# Patient Record
Sex: MALE | Race: WHITE | NOT HISPANIC OR LATINO | URBAN - METROPOLITAN AREA
[De-identification: names, ages, dates, MRNs, and addresses within clinical notes are randomized per-mention and may not be internally consistent; named-entity substitution may affect disease eponyms.]

---

## 2019-02-28 ENCOUNTER — OFFICE VISIT (OUTPATIENT)
Dept: NEUROLOGY | Facility: CLINIC | Age: 59
End: 2019-02-28

## 2019-02-28 VITALS
DIASTOLIC BLOOD PRESSURE: 78 MMHG | WEIGHT: 286 LBS | BODY MASS INDEX: 40.04 KG/M2 | OXYGEN SATURATION: 97 % | HEIGHT: 71 IN | HEART RATE: 70 BPM | SYSTOLIC BLOOD PRESSURE: 132 MMHG

## 2019-02-28 DIAGNOSIS — R25.1 TREMOR: Primary | ICD-10-CM

## 2019-02-28 PROCEDURE — 99204 OFFICE O/P NEW MOD 45 MIN: CPT | Performed by: PSYCHIATRY & NEUROLOGY

## 2019-02-28 RX ORDER — IBUPROFEN 800 MG/1
800 TABLET ORAL 2 TIMES DAILY
Refills: 0 | COMMUNITY
Start: 2019-01-18

## 2019-02-28 RX ORDER — DAPAGLIFLOZIN 5 MG/1
5 TABLET, FILM COATED ORAL DAILY
Refills: 4 | COMMUNITY
Start: 2019-02-18

## 2019-02-28 RX ORDER — PRIMIDONE 50 MG/1
50 TABLET ORAL NIGHTLY
Qty: 30 TABLET | Refills: 1 | Status: SHIPPED | OUTPATIENT
Start: 2019-02-28 | End: 2019-04-10 | Stop reason: SDUPTHER

## 2019-02-28 RX ORDER — ATORVASTATIN CALCIUM 40 MG/1
40 TABLET, FILM COATED ORAL DAILY
Refills: 0 | COMMUNITY
Start: 2019-01-04

## 2019-02-28 RX ORDER — ASPIRIN 325 MG
325 TABLET ORAL DAILY
COMMUNITY

## 2019-02-28 NOTE — PROGRESS NOTES
Subjective:    CC: Santino Burleson is seen today in consultation at the request of Isaías Nair MD for Tremors       HPI:  Patient is a 58-year-old male with past medical history of hypertension, hyperlipidemia, diabetes referred to the clinic for evaluation of tremors.  He reports that he started having tremors about an year ago.  It started in both his hands but slightly more so on the left than on the right.  Tremors are predominantly present when he is using his hands but occasionally he does notice it at rest and also.  It is difficult for him to hold cup of coffee or glass of water.  It is become really difficult for him to write.  He denies any difficulty with walking.  He is concerned because his uncle has a diagnosis of Parkinson's disease.  He denies any loss of sense of smell, problems with memory or episodes of acting out dreams at night.  Anxiety and nervousness makes tremors worse.    The following portions of the patient's history were reviewed today and updated as of 02/28/2019  : allergies, social history and problem list.  This document will be scanned to patient's chart.      Current Outpatient Medications:   •  aspirin 325 MG tablet, Take 325 mg by mouth Daily., Disp: , Rfl:   •  atorvastatin (LIPITOR) 40 MG tablet, Take 40 mg by mouth Daily., Disp: , Rfl: 0  •  FARXIGA 5 MG tablet tablet, Take 5 mg by mouth Daily., Disp: , Rfl: 4  •  ibuprofen (ADVIL,MOTRIN) 800 MG tablet, Take 800 mg by mouth 2 (Two) Times a Day., Disp: , Rfl: 0  •  metFORMIN (GLUCOPHAGE) 500 MG tablet, Take 500 mg by mouth 2 (Two) Times a Day., Disp: , Rfl: 5  •  primidone (MYSOLINE) 50 MG tablet, Take 1 tablet by mouth Every Night. Take half tab at bedtime for one week and then 1 tab after that., Disp: 30 tablet, Rfl: 1   Past Medical History:   Diagnosis Date   • Cancer (CMS/HCC)    • Diabetes mellitus (CMS/HCC)    • Hypertension    • Parkinson disease (CMS/HCC)    • Stroke (CMS/HCC)     eye      History reviewed. No  "pertinent surgical history.   Family History   Problem Relation Age of Onset   • Heart disease Father       Review of Systems   Eyes: Positive for visual disturbance.   Cardiovascular: Positive for palpitations.   Musculoskeletal: Positive for arthralgias and back pain.   Neurological: Positive for dizziness, light-headedness, numbness and headache.   Psychiatric/Behavioral: The patient is nervous/anxious.    All other systems reviewed and are negative.      All other systems reviewed and are negative     Objective:    /78 (BP Location: Right arm, Patient Position: Sitting, Cuff Size: Adult)   Pulse 70   Ht 180.3 cm (71\")   Wt 130 kg (286 lb)   SpO2 97%   BMI 39.89 kg/m²     Neurology Exam:    General apperance: NAD.     Mental status: Alert, awake and oriented to time place and person.    Recent and Remote memory: Can recall 3/3 objects at 5 minutes. Can recall historical events.     Attention span and Concentration: Serial 7s: Normal.     Fund of knowledge:  Normal.     Language and Speech: No aphasia or dysarthria.    Naming , Repitition and Comprehension:  Can name objects, repeat a sentence and follow commands. Speech is clear and fluent with good repetition, comprehension, and naming.    Cranial Nerves:   CN II: Visual fields are full. Intact. Fundi - Normal, No papillederma, Pupils - CHA  CN III, IV and VI: Extraocular movements are intact. Normal saccades.   CN V: Facial sensation is intact.   CN VII: Muscles of facial expression reveal no asymmetry. Intact.   CN VIII: Hearing is intact. Whispered voice intact.   CN IX and X: Palate elevates symmetrically. Intact  CN XI: Shoulder shrug is intact.   CN XII: Tongue is midline without evidence of atrophy or fasciculation.     Motor:  Right UE muscle strength 5/5. Normal tone.     Left UE muscle strength 5/5. Normal tone.      Right LE muscle strength5/5. Normal tone.     Left LE muscle strength 5/5. Normal tone.      Sensory: Normal light touch, " vibration and pinprick sensation bilaterally.    DTRs: 2+ bilaterally in upper and lower extremities.    Babinski: Negative bilaterally.    Co-ordination: Normal finger-to-nose, heel to shin B/L.  Mild postural tremors bilaterally and moderate action tremors were seen bilaterally.  No resting tremors noted.  No head tremor seen.    Rhomberg: Negative.    Gait: Normal.    Cardiovascular: Regular rate and rhythm without murmur, gallop or rub.    Assessment and Plan:  1. Tremor  Benign essential tremors.  I reassured him that he does not have Parkinson's.  He had normal tone without any evidence of rigidity, walking was normal so as finger tapping and foot tapping.  He did have both action and postural tremors representing the likely benign essential tremors.  Will initiate primidone 25 mg at bedtime for 1 week then 50 mg after that for symptomatic relief.  If no response and no side effects and it can be increased further.       Return in about 6 weeks (around 4/11/2019).     Chapito Menezes MD

## 2019-04-10 ENCOUNTER — OFFICE VISIT (OUTPATIENT)
Dept: NEUROLOGY | Facility: CLINIC | Age: 59
End: 2019-04-10

## 2019-04-10 VITALS
BODY MASS INDEX: 40.04 KG/M2 | HEIGHT: 71 IN | SYSTOLIC BLOOD PRESSURE: 126 MMHG | DIASTOLIC BLOOD PRESSURE: 80 MMHG | WEIGHT: 286 LBS

## 2019-04-10 DIAGNOSIS — R25.1 TREMOR: Primary | ICD-10-CM

## 2019-04-10 PROCEDURE — 99213 OFFICE O/P EST LOW 20 MIN: CPT | Performed by: PSYCHIATRY & NEUROLOGY

## 2019-04-10 RX ORDER — PRIMIDONE 50 MG/1
50 TABLET ORAL NIGHTLY
Qty: 30 TABLET | Refills: 1 | Status: SHIPPED | OUTPATIENT
Start: 2019-04-10 | End: 2019-11-05 | Stop reason: SDUPTHER

## 2019-04-10 NOTE — PROGRESS NOTES
"Subjective:    CC: Santino Burleson is in clinic today for follow up for tremors.    HPI:  He is in clinic for regular follow-up.  Since the last visit, he reports that he took primidone 25 mg at bedtime initially for first week and it helped somewhat with reducing tremor intensity.  When he increased dose to 50 mg, there was not much difference in reduction in tremor but he feels somewhat depressed.  He denies any other side effects with primidone use.  It has improved his sleep quality as well.    The following portions of the patient's history were reviewed and updated as of 04/10/2019: allergies, social history and problem list.       Current Outpatient Medications:   •  aspirin 325 MG tablet, Take 325 mg by mouth Daily., Disp: , Rfl:   •  atorvastatin (LIPITOR) 40 MG tablet, Take 40 mg by mouth Daily., Disp: , Rfl: 0  •  FARXIGA 5 MG tablet tablet, Take 5 mg by mouth Daily., Disp: , Rfl: 4  •  ibuprofen (ADVIL,MOTRIN) 800 MG tablet, Take 800 mg by mouth 2 (Two) Times a Day., Disp: , Rfl: 0  •  metFORMIN (GLUCOPHAGE) 500 MG tablet, Take 500 mg by mouth 2 (Two) Times a Day., Disp: , Rfl: 5  •  primidone (MYSOLINE) 50 MG tablet, Take 1 tablet by mouth Every Night. Take half tab at bedtime for one week and then 1 tab after that., Disp: 30 tablet, Rfl: 1   Past Medical History:   Diagnosis Date   • Cancer (CMS/HCC)    • Diabetes mellitus (CMS/HCC)    • Hypertension    • Parkinson disease (CMS/HCC)    • Stroke (CMS/HCC)     eye      History reviewed. No pertinent surgical history.   Family History   Problem Relation Age of Onset   • Heart disease Father         Review of Systems   All other systems reviewed and are negative.    Objective:    /80   Ht 180.3 cm (71\")   Wt 130 kg (286 lb)   BMI 39.89 kg/m²     Neurology Exam:  General apperance: NAD.     Mental status: Alert, awake and oriented to time place and person.    Recent and Remote memory: Can recall 3/3 objects at 5 minutes. Can recall historical events. "     Attention span and Concentration: Serial 7s: Normal.     Fund of knowledge:  Normal.     Language and Speech: No aphasia or dysarthria.    Naming , Repitition and Comprehension:  Can name objects, repeat a sentence and follow commands. Speech is clear and fluent with good repetition, comprehension, and naming.    CN II to XII: Intact.    Opthalmoscopic Exam: No papilledema.    Motor:  Right UE muscle strength 5/5. Normal tone.     Left UE muscle strength 5/5. Normal tone.      Right LE muscle strength5/5. Normal tone.     Left LE muscle strength 5/5. Normal tone.      Sensory: Normal light touch, vibration and pinprick sensation bilaterally.    DTRs: 2+ bilaterally.    Babinski: Negative bilaterally.    Co-ordination: Normal finger-to-nose, heel to shin B/L.  Mild postural and kinetic tremors seen bilaterally-worse on the left than on the right.-Better than the last visit.    Rhomberg: Negative.    Gait: Normal.    Cardiovascular: Regular rate and rhythm without murmur, gallop or rub.    Assessment and Plan:  1. Tremor  Benign essential tremors.  He did respond to primidone 50 mg dose but is reporting that he feels somewhat depressed with 50 mg dose.  I have advised him to reduce it back to 25 mg dose for the next couple of weeks and then increase it to 50 mg and see how he does.  Tremors have reduced somewhat in intensity.  If he tolerates 50 mg better in future than it can be increased further to 75 or 100 mg.  I will see him back in 6 weeks for follow-up.       I spent 15 minutes face to face with the patient and spent 10 minutes of this time  in management, instructions and education regarding above mentioned diagnosis and also on counseling and discussing about taking medication regularly, possible side effects with medication use, importance of good sleep hygiene, good hydration and regular exercise.    No Follow-up on file.

## 2019-11-05 RX ORDER — PRIMIDONE 50 MG/1
50 TABLET ORAL NIGHTLY
Qty: 30 TABLET | Refills: 1 | Status: SHIPPED | OUTPATIENT
Start: 2019-11-05